# Patient Record
Sex: MALE | HISPANIC OR LATINO | URBAN - METROPOLITAN AREA
[De-identification: names, ages, dates, MRNs, and addresses within clinical notes are randomized per-mention and may not be internally consistent; named-entity substitution may affect disease eponyms.]

---

## 2024-02-08 ENCOUNTER — LAB (OUTPATIENT)
Dept: URBAN - METROPOLITAN AREA CLINIC 74 | Facility: CLINIC | Age: 54
End: 2024-02-08

## 2024-02-08 ENCOUNTER — OV NP (OUTPATIENT)
Dept: URBAN - METROPOLITAN AREA CLINIC 74 | Facility: CLINIC | Age: 54
End: 2024-02-08
Payer: COMMERCIAL

## 2024-02-08 VITALS
SYSTOLIC BLOOD PRESSURE: 118 MMHG | DIASTOLIC BLOOD PRESSURE: 80 MMHG | OXYGEN SATURATION: 98 % | WEIGHT: 208 LBS | HEIGHT: 64 IN | TEMPERATURE: 97.3 F | BODY MASS INDEX: 35.51 KG/M2 | HEART RATE: 72 BPM

## 2024-02-08 DIAGNOSIS — Z12.11 COLON CANCER SCREENING: ICD-10-CM

## 2024-02-08 DIAGNOSIS — R12 HEARTBURN: ICD-10-CM

## 2024-02-08 DIAGNOSIS — K76.0 FATTY LIVER: ICD-10-CM

## 2024-02-08 DIAGNOSIS — K21.9 GASTROESOPHAGEAL REFLUX DISEASE, UNSPECIFIED WHETHER ESOPHAGITIS PRESENT: ICD-10-CM

## 2024-02-08 PROBLEM — 162864005: Status: ACTIVE | Noted: 2024-02-08

## 2024-02-08 PROBLEM — 235595009: Status: ACTIVE | Noted: 2024-02-08

## 2024-02-08 PROBLEM — 197321007: Status: ACTIVE | Noted: 2024-02-08

## 2024-02-08 PROBLEM — 16331000: Status: ACTIVE | Noted: 2024-02-08

## 2024-02-08 PROBLEM — 29857009: Status: ACTIVE | Noted: 2024-02-08

## 2024-02-08 PROBLEM — 305058001: Status: ACTIVE | Noted: 2024-02-08

## 2024-02-08 PROCEDURE — 99204 OFFICE O/P NEW MOD 45 MIN: CPT | Performed by: INTERNAL MEDICINE

## 2024-02-08 NOTE — HPI-TODAY'S VISIT:
The patient a 53-year-old  male self-referred who presents to the office stating that his primary care physician advised him to give a screening colonoscopy.The patient has never had a colonoscopy.  The patient denies having any diarrhea, constipation, rectal bleeding or hematochezia, abdominal pain, bloating, abdominal distention, unexplained weight loss. The patient did complain of chronic gastroesophageal reflux and heartburn, he claims that as far as he can recall he is always at some heartburn, the patient states that he was temporarily treated by his PCP and the heartburn improved but now every time he gets off his diet or eats spicy food he gets heartburn.  The patient also complaint of anterior chest wall discomfort unrelated to physical activity or food ingestion, position. The patient denied having any dysphagia, odynophagia, nausea, vomiting, there has been no hematemesis or melena. The patient is obese.  The patient has been instructed to get a colonoscopy and an EGD benefits potential complications and alternatives to both procedures were disclosed. The patient will be scheduled to have a right upper quadrant ultrasound looking for fatty liver, we will also obtain a hepatic function panel with fib 4. The patient has been instructed to follow-up antireflux measures and diet as well as a Mediterranean diet. The patient will return for follow-up visit after completion of testing.

## 2024-02-13 ENCOUNTER — US (OUTPATIENT)
Dept: URBAN - METROPOLITAN AREA CLINIC 73 | Facility: CLINIC | Age: 54
End: 2024-02-13
Payer: COMMERCIAL

## 2024-02-13 DIAGNOSIS — K76.0 FATTY LIVER: ICD-10-CM

## 2024-02-13 PROCEDURE — 76705 ECHO EXAM OF ABDOMEN: CPT | Performed by: INTERNAL MEDICINE

## 2024-02-14 LAB
ALBUMIN/GLOBULIN RATIO: 1.7
ALBUMIN: 4.8
ALKALINE PHOSPHATASE: 64
ALT: 72
AST: 39
BILIRUBIN, DIRECT: 0.2
BILIRUBIN, INDIRECT: 1.2
BILIRUBIN, TOTAL: 1.4
FIB 4 INDEX: 0.79
GLOBULIN: 2.8
PLATELET COUNT: 302
PROTEIN, TOTAL: 7.6

## 2024-03-19 ENCOUNTER — COL/EGD (OUTPATIENT)
Dept: URBAN - METROPOLITAN AREA SURGERY CENTER 30 | Facility: SURGERY CENTER | Age: 54
End: 2024-03-19
Payer: COMMERCIAL

## 2024-03-19 ENCOUNTER — LAB (OUTPATIENT)
Dept: URBAN - METROPOLITAN AREA CLINIC 4 | Facility: CLINIC | Age: 54
End: 2024-03-19
Payer: COMMERCIAL

## 2024-03-19 DIAGNOSIS — B96.81 BACTERIAL INFECTION DUE TO H. PYLORI: ICD-10-CM

## 2024-03-19 DIAGNOSIS — R12 BURNING REFLUX: ICD-10-CM

## 2024-03-19 DIAGNOSIS — K29.60 OTHER GASTRITIS WITHOUT BLEEDING: ICD-10-CM

## 2024-03-19 DIAGNOSIS — R07.89 ACUTE CHEST WALL PAIN: ICD-10-CM

## 2024-03-19 DIAGNOSIS — K21.9 ACID REFLUX: ICD-10-CM

## 2024-03-19 DIAGNOSIS — D12.5 ADENOMA OF SIGMOID COLON: ICD-10-CM

## 2024-03-19 DIAGNOSIS — K31.89 OTHER DISEASES OF STOMACH AND DUODENUM: ICD-10-CM

## 2024-03-19 DIAGNOSIS — K63.89 OTHER SPECIFIED DISEASES OF INTESTINE: ICD-10-CM

## 2024-03-19 DIAGNOSIS — B96.81 HELICOBACTER PYLORI [H. PYLORI] AS THE CAUSE OF DISEASES CLASSIFIED ELSEWHERE: ICD-10-CM

## 2024-03-19 DIAGNOSIS — Z12.11 COLON CANCER SCREENING: ICD-10-CM

## 2024-03-19 DIAGNOSIS — K31.89 ACHYLIA: ICD-10-CM

## 2024-03-19 PROCEDURE — 43239 EGD BIOPSY SINGLE/MULTIPLE: CPT | Performed by: INTERNAL MEDICINE

## 2024-03-19 PROCEDURE — 88312 SPECIAL STAINS GROUP 1: CPT | Performed by: PATHOLOGY

## 2024-03-19 PROCEDURE — 88305 TISSUE EXAM BY PATHOLOGIST: CPT | Performed by: PATHOLOGY

## 2024-03-19 PROCEDURE — 45380 COLONOSCOPY AND BIOPSY: CPT | Performed by: INTERNAL MEDICINE

## 2024-04-02 PROBLEM — 721730009: Status: ACTIVE | Noted: 2024-04-02

## 2024-04-02 PROBLEM — 428054006: Status: ACTIVE | Noted: 2024-04-02

## 2024-04-02 PROBLEM — 84089009: Status: ACTIVE | Noted: 2024-04-02

## 2024-04-02 PROBLEM — 724517005: Status: ACTIVE | Noted: 2024-04-02

## 2024-04-02 PROBLEM — 235595009: Status: ACTIVE | Noted: 2024-04-02

## 2024-04-15 ENCOUNTER — OV EP (OUTPATIENT)
Dept: URBAN - METROPOLITAN AREA CLINIC 74 | Facility: CLINIC | Age: 54
End: 2024-04-15
Payer: COMMERCIAL

## 2024-04-15 ENCOUNTER — LAB (OUTPATIENT)
Dept: URBAN - METROPOLITAN AREA CLINIC 74 | Facility: CLINIC | Age: 54
End: 2024-04-15

## 2024-04-15 VITALS
HEART RATE: 65 BPM | WEIGHT: 208 LBS | TEMPERATURE: 97.3 F | DIASTOLIC BLOOD PRESSURE: 78 MMHG | BODY MASS INDEX: 35.51 KG/M2 | SYSTOLIC BLOOD PRESSURE: 112 MMHG | HEIGHT: 64 IN | OXYGEN SATURATION: 98 %

## 2024-04-15 DIAGNOSIS — R74.01 ELEVATED TRANSAMINASE LEVEL: ICD-10-CM

## 2024-04-15 DIAGNOSIS — A04.8 OTHER SPECIFIED BACTERIAL INTESTINAL INFECTIONS: ICD-10-CM

## 2024-04-15 DIAGNOSIS — K44.9 HIATAL HERNIA: ICD-10-CM

## 2024-04-15 DIAGNOSIS — D12.5 ADENOMATOUS POLYP OF SIGMOID COLON: ICD-10-CM

## 2024-04-15 PROBLEM — 27503000: Status: ACTIVE | Noted: 2024-04-15

## 2024-04-15 PROCEDURE — 99214 OFFICE O/P EST MOD 30 MIN: CPT | Performed by: INTERNAL MEDICINE

## 2024-04-15 RX ORDER — VONOPRAZAN FUMARATE, AMOXICILLIN AND CLARITHROMYCIN 20-500-500
AS DIRECTED KIT ORAL TWICE A DAY
Qty: 14 | Refills: 0 | OUTPATIENT
Start: 2024-04-15 | End: 2024-04-29

## 2024-04-15 NOTE — HPI-TODAY'S VISIT:
The patient a 53-year-old  male self-referred who presents to the office stating that his primary care physician advised him to give a screening colonoscopy.The patient has never had a colonoscopy.  The patient denies having any diarrhea, constipation, rectal bleeding or hematochezia, abdominal pain, bloating, abdominal distention, unexplained weight loss. The patient did complain of chronic gastroesophageal reflux and heartburn, he claims that as far as he can recall he is always at some heartburn, the patient states that he was temporarily treated by his PCP and the heartburn improved but now every time he gets off his diet or eats spicy food he gets heartburn.  The patient also complaint of anterior chest wall discomfort unrelated to physical activity or food ingestion, position. The patient denied having any dysphagia, odynophagia, nausea, vomiting, there has been no hematemesis or melena. The patient is obese.  The patient has been instructed to get a colonoscopy and an EGD benefits potential complications and alternatives to both procedures were disclosed. The patient will be scheduled to have a right upper quadrant ultrasound looking for fatty liver, we will also obtain a hepatic function panel with fib 4. The patient has been instructed to follow-up antireflux measures and diet as well as a Mediterranean diet. The patient will return for follow-up visit after completion of testing.  Today April 15, 2024 the patient returns for a follow-up visit, the patient was last seen on February 8, 2024 with gastroesophageal reflux disease, chest pain, heartburn, obesity and fatty liver and for colon cancer screening.  At the time of the visit the patient stated that he was advised by primary care to get a screening colonoscopy, the patient has never had one before, during the visit he denies having diarrhea, constipation, rectal bleeding or hematochezia, there was no abdominal pain bloating or distention.    The patient did complain of chronic gastroesophageal reflux and heartburn, the patient did claim that as far as he could recall he always had some heartburn.  In the past he had temporarily been treated by PCP with improvement on the heartburn, he continued to get heartburn when eating spicy food.  The patient also complaint of anterior chest wall discomfort unrelated to physical activity with ingestion or position. There was no history of dysphagia, odynophagia, nausea, vomiting, there was no evidence of GI bleeding. The patient was obese. The patient was instructed to get an EGD and a colonoscopy, right upper quadrant ultrasound looking for fatty liver and a hepatic function panel with fib 4 index.  The patient was instructed to follow Mediterranean diet as well as antireflux measures and diet. Laboratory revealed the fib 4 index of 0.79, bilirubin 1.4 with a direct of 0.2 suggestive of Gilbert's syndrome with an AST of 39 and ALT of 72, normal alkaline phosphatase.  Normal albumin. The patient had an ultrasound on February 13, 2024 which revealed increased echogenicity in the liver with no focal lesions, normal gallbladder with no gallstones, common bile duct measured 2 mm.  Changes were highly suggestive of fatty liver. The patient had an EGD on March 19, 2024 and it revealed a small hiatal hernia, gastric antral and body erythema, normal duodenal mucosa.  Biopsies revealed chronic H. pylori gastritis with no evidence of intestinal metaplasia dysplasia or malignancy.  Duodenal biopsies were normal, the esophagus revealed reflux type changes with no columnar mucosa. The patient had a colonoscopy on the same day which revealed a normal terminal ileum, small nonbleeding internal hemorrhoids, sigmoid 2 mm sessile adenomatous polyp removed with cold biopsy forceps and a 2 mm lymphoid aggregate removed out of the cecum with cold biopsy forceps. Today the patient returns to the office stating that he continues to have episodes of gastroesophageal reflux, I discussed with the patient the recent EGD reports and made him aware that he did have a hiatal hernia, H. pylori induced gastritis and normal duodenal mucosa, we also discussed the recent ultrasound and hepatic function panel with fib 4 which revealed elevated transaminases but the negative index, the ultrasound showed changes suggestive of fatty liver.  Because of the persistent elevation of the transaminases the patient is to get a hepatitis panel, anti-smooth muscle antibodies, RENETTA, iron studies and ferritin, ceruloplasmin.  The patient has been instructed to follow Mediterranean diet. The patient's colonoscopy was discussed with the patient and he did reveal a 2 mm sessile adenomatous polyp removed from the sigmoid colon.  The patient will be due to have a surveillance colonoscopy in February 2029. And the patient will initiate treatment with Voquenza triple pack for H. pylori x 14 days, subsequently he is to have a H. pylori breath test 30 days after completion of testing and off PPIs. The patient will return for follow-up visit in 3 months.